# Patient Record
Sex: MALE
[De-identification: names, ages, dates, MRNs, and addresses within clinical notes are randomized per-mention and may not be internally consistent; named-entity substitution may affect disease eponyms.]

---

## 2018-10-03 ENCOUNTER — HOSPITAL ENCOUNTER (INPATIENT)
Dept: HOSPITAL 14 - H.ER | Age: 28
LOS: 6 days | Discharge: HOME | DRG: 430 | End: 2018-10-09
Attending: PSYCHIATRY & NEUROLOGY | Admitting: PSYCHIATRY & NEUROLOGY
Payer: COMMERCIAL

## 2018-10-03 VITALS — BODY MASS INDEX: 34.8 KG/M2

## 2018-10-03 DIAGNOSIS — F90.9: ICD-10-CM

## 2018-10-03 DIAGNOSIS — F33.9: Primary | ICD-10-CM

## 2018-10-03 DIAGNOSIS — Z91.14: ICD-10-CM

## 2018-10-03 DIAGNOSIS — F51.05: ICD-10-CM

## 2018-10-03 DIAGNOSIS — Z23: ICD-10-CM

## 2018-10-03 DIAGNOSIS — F12.10: ICD-10-CM

## 2018-10-03 DIAGNOSIS — F41.0: ICD-10-CM

## 2018-10-03 DIAGNOSIS — K58.9: ICD-10-CM

## 2018-10-03 DIAGNOSIS — F17.200: ICD-10-CM

## 2018-10-03 DIAGNOSIS — F41.1: ICD-10-CM

## 2018-10-04 VITALS — OXYGEN SATURATION: 97 %

## 2018-10-04 LAB
HDLC SERPL-MCNC: 33 MG/DL (ref 30–70)
LDLC SERPL-MCNC: 93 MG/DL (ref 0–129)
T4 SERPL-MCNC: 6.93 UG/DL (ref 5.5–11)

## 2018-10-04 PROCEDURE — GZHZZZZ GROUP PSYCHOTHERAPY: ICD-10-PCS | Performed by: PSYCHIATRY & NEUROLOGY

## 2018-10-04 PROCEDURE — 3E02340 INTRODUCTION OF INFLUENZA VACCINE INTO MUSCLE, PERCUTANEOUS APPROACH: ICD-10-PCS | Performed by: PSYCHIATRY & NEUROLOGY

## 2018-10-04 PROCEDURE — GZ51ZZZ INDIVIDUAL PSYCHOTHERAPY, BEHAVIORAL: ICD-10-PCS | Performed by: PSYCHIATRY & NEUROLOGY

## 2018-10-04 NOTE — PCM.PSYCH
Initial Psychiatric Evaluation





- Initial Psychiatric Evaluation


Type of Admission: Voluntary


Legal Status: Capacity


Chief Complaint (in patient's own words): 





I am feeling depressed and scared


History of Present Illness and Precipitating Events: 





pt is 28 ys old male with previous psychiatric diagnosis of depression and 

anxiety, last hospitalization at [facility] 8 months ago, non compliant 

with medications or follow up for past two months, pt reported has been feeling 

increasingly depressed due to financial status , living with a friend, reported 

decreased sleep with early insomnia, low energy, low interest and anhedonia, pt 

also has been feeling increasingly anxious with sense of dome , fear and panic 

attacks in the form of apprehension and tense mood, he came to ER seeking help


pt reported using cannabis, denied active suicidal ideation on the unit, denied 

perceptual disturbances  


Current Medications: 





Active Medications











Generic Name Dose Route Start Last Admin





  Trade Name Freq  PRN Reason Stop Dose Admin


 


Acetaminophen  650 mg  10/04/18 00:16  





  Tylenol 325mg Tab  PO   





  Q4 PRN   





  pain level 4-7   





     





     





     


 


Al Hydrox/Mg Hydrox/Simethicone  30 ml  10/04/18 00:16  





  Maalox Plus 30 Ml  PO   





  Q4 PRN   





  Dyspepsia   





     





     





     


 


Diphenhydramine HCl  50 mg  10/04/18 00:16  





  Benadryl  IM   





  Q6 PRN   





  Extrapyramidal S/S Unable PO   





     





     





     


 


Diphenhydramine HCl  50 mg  10/04/18 00:16  





  Benadryl  PO   





  Q6 PRN   





  Extrapyramidal Symptoms   





     





     





     


 


Diphenhydramine HCl  50 mg  10/04/18 00:18  





  Benadryl  PO   





  HS PRN   





  Sleep   





     





     





     


 


Duloxetine HCl  30 mg  10/04/18 09:00  





  Cymbalta  PO   





  DAILY MADY   





     





     





     





     


 


Gabapentin  100 mg  10/04/18 09:00  





  Neurontin  PO   





  TID MADY   





     





     





     





     


 


Haloperidol  5 mg  10/04/18 00:16  





  Haldol  PO   





  Q4 PRN   





  Agitation   





     





     





     


 


Haloperidol Lactate  5 mg  10/04/18 00:16  





  Haldol  IM   





  Q4 PRN   





  Agitation, Unable to Take PO   





     





     





     


 


Hydroxyzine Pamoate  50 mg  10/04/18 08:58  





  Vistaril  PO   





  Q8 PRN   





  Anxiety   





     





     





     


 


Influenza Virus Vaccine  60 mcg  10/04/18 09:00  





  Afluria Quad (Pf) [id number]  IM  10/04/18 09:01  





  .ONCE ONE   





     





     





     





     


 


Lorazepam  1 mg  10/04/18 00:16  





  Ativan  IM   





  Q8H PRN   





  Anxiety/Agitation,Unable PO   





     





     





     


 


Lorazepam  1 mg  10/04/18 00:16  





  Ativan  PO   





  Q8H PRN   





  Anxiety/Agitation   





     





     





     


 


Magnesium Hydroxide  30 ml  10/04/18 00:16  





  Milk Of Magnesia  PO   





  HS PRN   





  Constipation   





     





     





     


 


Trazodone HCl  300 mg  10/04/18 22:00  





  Desyrel  PO   





  HS MADY   





     





     





     





     


 


Trazodone HCl  100 mg  10/04/18 22:00  





  Desyrel  PO   





  HS MADY   





     





     





     





     














Past Psychiatric History





- Past Psychiatric History


Explanation of prior treatment: 





five inpatient hospitalizations since age 21 for depression and anxiety


no hx of previous suicidal attempts


Pertinent Medical Hx (Current Medical&Sleep Prob, Allergies): 





                                    Allergies











Allergy/AdvReac Type Severity Reaction Status Date / Time


 


No Known Allergies Allergy   Verified 04/21/17 13:21








                                        





Mirtazapine [Remeron] 45 mg PO HS PRN #14 tab 04/26/17 


Nicotine 7 mg/24 hr [Nicoderm CQ] 1 patch TD DAILY #14 patch 04/26/17 


Venlafaxine [Effexor XR] 150 mg PO DAILY #14 cer 04/26/17 


buPROPion XL [Wellbutrin XL] 300 mg PO DAILY #14 t24 04/26/17 


risperiDONE [RisperDAL Tab] 0.5 mg PO AMHS #30 tab 04/26/17 











Mental Status Examination





- Personal Presentation


Personal Presentation: Looks stated age


Additional comments: 





unkempt





- Affect


Affect: Constricted, Depressed





- Motor Activity


Motor Activity: Psychomotor Retardation





- Reliability in Providing Information


Reliability in Providing Information: Fair





- Speech


Speech: Relevant





- Mood


Mood: Depressed, Anxious





- Formal Thought Process


Formal Thought Process: Circumstantial





- Obsessions/Compulsions


Obsessions: No


Compulsions: No





- Cognitive Functions


Orientation: Person, Place, Situation


Sensorium: Alert


Judgement: Imparied, as evidence by: Poor judgement





- Risk


Risk: Diminished functioning





- Strength & Assets Inventory


Strength & Assets Inventory: Life experience





- Limitations


Additional comments: 


poor compliance








DSM 5 DX





- DSM 5


DSM 5 Diagnosis: 





major depression recurrent


genralized anxiety disorder 


cannabis abuse


IBS





- Recommended/Plan of Treatment


Treatment Recommendations and Plan of Treatment: 





START CYMBALTA 30MG DAILY, INCREASE GRADUALLY


START NEURONTIN 100MG TID


CBT motivational, group and supportive therapy

## 2018-10-04 NOTE — CP.PCM.CON
History of Present Illness





- History of Present Illness


History of Present Illness: 





27 yo male with history of depression and anxiety admitted to psyche unit 

because of worsening depression.

















Review of Systems





- Review of Systems


All systems: reviewed and no additional remarkable complaints except (aside from

those mentioned above, 12 point system review were negative by me)





Past Patient History





- Infectious Disease


Hx of Infectious Diseases: None





- Tetanus Immunizations


Tetanus Immunization: Unknown





- Past Medical History & Family History


Past Medical History?: No


Past Family History: Reviewed and not pertinent





- Past Social History


Smoking Status: Heavy Smoker > 10 Cigarettes Daily


Chewing Tobacco Use: No


Cigar Use: No


Alcohol: None


Drugs: Cannabis





- CARDIAC


Hx Cardiac Disorders: No





- PULMONARY


Hx Respiratory Disorders: No





- NEUROLOGICAL


Hx Neurological Disorder: No





- HEENT


Hx HEENT Problems: No





- RENAL


Hx Chronic Kidney Disease: No





- ENDOCRINE/METABOLIC


Hx Endocrine Disorders: No





- HEMATOLOGICAL/ONCOLOGICAL


Hx Blood Disorders: No





- INTEGUMENTARY


Hx Dermatological Problems: No





- MUSCULOSKELETAL/RHEUMATOLOGICAL


Hx Musculoskeletal Disorders: No





- GASTROINTESTINAL


Hx Irritable Bowel: Yes





- GENITOURINARY/GYNECOLOGICAL


Hx Genitourinary Disorders: No





- PSYCHIATRIC


Hx Substance Use: Yes





- SURGICAL HISTORY


Hx Surgeries: No





- ANESTHESIA


Hx Anesthesia: No





Meds


Allergies/Adverse Reactions: 


                                    Allergies











Allergy/AdvReac Type Severity Reaction Status Date / Time


 


No Known Allergies Allergy   Verified 04/21/17 13:21














- Medications


Medications: 


                               Current Medications





Acetaminophen (Tylenol 325mg Tab)  650 mg PO Q4 PRN


   PRN Reason: pain level 4-7


Al Hydrox/Mg Hydrox/Simethicone (Maalox Plus 30 Ml)  30 ml PO Q4 PRN


   PRN Reason: Dyspepsia


Diphenhydramine HCl (Benadryl)  50 mg IM Q6 PRN


   PRN Reason: Extrapyramidal S/S Unable PO


Diphenhydramine HCl (Benadryl)  50 mg PO Q6 PRN


   PRN Reason: Extrapyramidal Symptoms


Diphenhydramine HCl (Benadryl)  50 mg PO HS PRN


   PRN Reason: Sleep


Duloxetine HCl (Cymbalta)  30 mg PO DAILY [facility]


   Last Admin: 10/04/18 15:00 Dose:  30 mg


Gabapentin (Neurontin)  100 mg PO TID [facility]


   Last Admin: 10/04/18 17:32 Dose:  100 mg


Haloperidol (Haldol)  5 mg PO Q4 PRN


   PRN Reason: Agitation


Haloperidol Lactate (Haldol)  5 mg IM Q4 PRN


   PRN Reason: Agitation, Unable to Take PO


Hydroxyzine Pamoate (Vistaril)  50 mg PO Q8 PRN


   PRN Reason: Anxiety


   Last Admin: 10/04/18 17:34 Dose:  50 mg


Lorazepam (Ativan)  1 mg IM Q8H PRN


   PRN Reason: Anxiety/Agitation,Unable PO


Magnesium Hydroxide (Milk Of Magnesia)  30 ml PO HS PRN


   PRN Reason: Constipation


Trazodone HCl (Desyrel)  300 mg PO HS MADY











Physical Exam





- Constitutional


Appears: No Acute Distress





- Head Exam


Head Exam: ATRAUMATIC





- Eye Exam


Eye Exam: absent: Scleral icterus





- ENT Exam


ENT Exam: Mucous Membranes Moist





- Neck Exam


Neck exam: Negative for: Meningismus





- Respiratory Exam


Respiratory Exam: absent: Rales, Rhonchi, Wheezes, Respiratory Distress





- Cardiovascular Exam


Cardiovascular Exam: REGULAR RHYTHM, +S1, +S2





- GI/Abdominal Exam


GI & Abdominal Exam: Soft.  absent: Tenderness





- Rectal Exam


Rectal Exam: Deferred





- Extremities Exam


Extremities exam: Negative for: calf tenderness, pedal edema





- Back Exam


Back exam: NORMAL INSPECTION





- Neurological Exam


Neurological exam: Alert, Oriented x3





- Psychiatric Exam


Psychiatric exam: Normal Affect





- Skin


Skin Exam: Dry, Intact





Results





- Vital Signs


Recent Vital Signs: 


                                Last Vital Signs











Temp  97.3 F L  10/04/18 01:00


 


Pulse  77   10/04/18 01:03


 


Resp  18   10/04/18 01:03


 


BP  124/66   10/04/18 01:00


 


Pulse Ox  97   10/04/18 00:08














- Labs


Labs: 


                         Laboratory Results - last 24 hr











  10/04/18 10/04/18 10/04/18





  06:00 06:00 06:00


 


Hemoglobin A1c   5.1 


 


Triglycerides  214 H  


 


Cholesterol  174  


 


LDL Cholesterol Direct  93  


 


HDL Cholesterol  33  


 


Thyroxine (T4)  6.93  


 


TSH 3rd Generation  0.94  


 


RPR    Nonreactive














Assessment & Plan


(1) Depression


Status: Acute   


Comment: psyche is managing

## 2018-10-04 NOTE — PCM.BM
<Kendell Huerta P - Last Filed: 10/04/18 00:31>





Treatment Plan Problems





- Problems identified on initial assessmt


  ** Hopelessness/Helplessness


Date Initiated: 10/04/18


Time Initiated: 00:32


Assessment reference: NA


Status: Active





  ** Medication nonadherence


Date Initiated: 10/04/18


Time Initiated: 00:32


Assessment reference: NA





Treatment assets and liabiliti


Patient Assests: cooperative, ADL independent, physically healthy, negotiates 

basic needs, cognitively intact


Patient Liabilities: substance abuse





- Milieu Protocol


Maintain good personal hygiene: daily Encourage regular showers, daily Remind 

patient to perform daily oral care, daily Assist patient to perform ADL's


Conduct patient checks and document Observation sheet: Q15 minutes


Maintain personal safety: every shift Educate patient to report safety concerns 

to staff, every shift Monitor environment for contraband/sharps


Medication safety: Monitor for expected outcome, potential side effects: every 

shift, Assess barriers to learning: every shift, Assess readiness for medication

education: every shift





<Mario Ace J - Last Filed: 10/05/18 15:02>





Family Contact


Family involvement: Famliy/SO not involved


Family contact: Patient declines to allow family contact at present


Family contact name: Pt denied. 





- Goals for Treatment


Patient goals for treatment: Pt reported he would like his depression lessened 

so he can function and care for himself.





Discharge/Continuing Care





- Education Needs


Education Needs: Patient Medication, Patient Diagnosis/Disease Process, Patient 

Coping Skills, Patient Community resources, Patient Aftercare Safety Plan





- Discharge


Discharge Criteria: Tolerates medication w/o severe side effects, Free of 

Suicidal thoughts, Free of agitation, Normal sleep pattern, Ability to care for 

self, Reduction of target symptoms


Discharge to:: Home





- Treatment Team Participation


Patient/Family/SO Statement: 





10/05/18 15:03


Pt seen in team. Pt reported he is "overwhelmingly" depressed, but denied SI. Pt

denied any other complaints and wants to follow-up with [facility] outpatient 

upon discharge. 


Discussed with Family/SO: No


Was Patient/Family/SO present at Treatment Team Meeting: Yes





<Soha Andrade - Last Filed: 10/08/18 14:42>





- Diagnosis


(1) Depression


Status: Acute   


Interventions: 


psychotherapy, pharmacotherapy


10/08/18 14:42

## 2018-10-05 NOTE — PCM.PYCHPN
Psychiatric Progress Note





- Psychiatric Progress Note


Patient seen today, length of contact: pt evaluated discussed with team chart 

reviewed


Patient Chief Complaint: 





I have overwhelming anxiety and depression


Problems Identified/Issues Discussed: 


pt evaluated with treatment team, dressed in hospital gown, unkempt, psychomotor

retardation noted, continues to report increased anxiety with sense of dome , 

feeling keyed up, reported low motivation, anhedonia , poor energy, increased 

sleep and lack of interest in any activity


discussed with pt increasing dose of cymbalta and neurontin, no reported side 

effects


encouraged pt to attend groups and participate in unit activities


pt denied current active suicidal ideation on the unit, denied perceptual 

disturbances 





Medical Problems: 





five inpatient hospitalizations since age 21 for depression and anxiety


no hx of previous suicidal attempts


DSM 5 Symptoms Update: 





major depression 


generalized anxiety disorder


cannabis use disorder


Medication Change: Yes (increase cymbalta)


Medical Record Reviewed: Yes





Mental Status Examination





- Cognitive Function


Orientation: Person, Place, Situation


Attention: WNL


Concentration: WNL


Association: WNL


Fund of Knowledge: Poor


Decription of patient's judgement and insights: 





partial insight , poor judgment 





- Mood


Mood: Depressed, Anxious





- Affect


Affect: Constricted, Depressed





- Speech


Speech: Soft





- Formal Thought Process


Formal Thought Process: Circumstantial


Psychotic Thoughts and Behaviors: 





pt denied, non elicited 





- Suicidal Ideation


Suicidal Ideation: No





- Homicidal Ideation


Homicidal Ideation: No





Goal/Treatment Plan





- Goal/Treatment Plan


Need for Continued Stay: Severe depression anxiety, Discharge may exacerbated 

symptoms, Failed transitioning


Progress Toward Problem(s) and Goals/Treatment Plan: 





increase cymbalta, 40mg daily


increase neurontin 200mg tid


CBT motivational, group and supportive therapy


 to arrange for follow up plan








Estimated Date of D/C: 10/09/18

## 2018-10-06 NOTE — PCM.PYCHPN
Psychiatric Progress Note





- Psychiatric Progress Note


Patient seen today, length of contact: pt evaluated discussed with team chart 

reviewed


Patient Chief Complaint: 





alteration in mood


Problems Identified/Issues Discussed: 





alteration in mood 


alteration in cognition 


Medical Problems: 





per chart


Diagnostic Results: 





per psychiatry


per medicine


per nursing


per social work


per recreational therapy


DSM 5 Symptoms Update: 





anxiety insomnia


Medication Change: No


Medical Record Reviewed: Yes


Consults ordered or reviewed: 





pt seen by hospitalist





Mental Status Examination





- Cognitive Function


Orientation: Person, Place, Situation


Attention: WNL


Concentration: WNL


Association: WNL


Fund of Knowledge: Poor


Decription of patient's judgement and insights: 





impaired





- Mood


Mood: Depressed, Anxious





- Affect


Affect: Constricted, Depressed





- Speech


Speech: Soft





- Formal Thought Process


Formal Thought Process: Circumstantial





- Suicidal Ideation


Suicidal Ideation: No





- Homicidal Ideation


Homicidal Ideation: No





Goal/Treatment Plan





- Goal/Treatment Plan


Need for Continued Stay: Severe depression anxiety, Discharge may exacerbated 

symptoms, Failed transitioning


Progress Toward Problem(s) and Goals/Treatment Plan: 





inpt milieu


adjust meds per status


assess for s/s of serotonin syndrome vital signs per clinical status and per 

protocol 


discharge planning in progress





Estimated Date of D/C: 10/09/18





- Smoking Cessation


Smoking Cessation Initiated: No


Reason for not providing: pt defers

## 2018-10-07 NOTE — PCM.PYCHPN
Psychiatric Progress Note





- Psychiatric Progress Note


Patient seen today, length of contact: pt evaluated discussed with team chart 

reviewed


Patient Chief Complaint: 





reports feeling anxious at times reports that in past has taken clonazepam, has 

been off of it 6 months has reported prn at home requests possibility of having 

here prn at [facility] as becomes nervous at night-staff report pt has slept with 

trazodone last night denies side effects medications. review possible use of 

buspar pt verbally agreeable  


Problems Identified/Issues Discussed: 





alteration in mood 


alteration in cognition 


Medical Problems: 





per chart


Diagnostic Results: 





per psychiatry


per medicine


per nursing


per social work


per recreational therapy


DSM 5 Symptoms Update: 





improving mood less anxious and depressed review can follow up opd related to 

reported hx of adhd-review general terms strattera but review with pt current on

cymbalta. staff reports pt was adherent. seen about unit. 


Medication Change: No


Medical Record Reviewed: Yes


Consults ordered or reviewed: 





seen by hospitalist





Mental Status Examination





- Cognitive Function


Orientation: Person, Place, Situation


Attention: WNL


Concentration: WNL


Association: WNL


Fund of Knowledge: Poor


Decription of patient's judgement and insights: 





impaired





- Mood


Mood: Depressed, Anxious





- Affect


Affect: Constricted, Depressed





- Speech


Speech: Soft





- Formal Thought Process


Formal Thought Process: Circumstantial





- Suicidal Ideation


Suicidal Ideation: No





- Homicidal Ideation


Homicidal Ideation: No





Goal/Treatment Plan





- Goal/Treatment Plan


Need for Continued Stay: Severe depression anxiety, Discharge may exacerbated 

symptoms, Failed transitioning


Progress Toward Problem(s) and Goals/Treatment Plan: 





inpt milieu


adjust meds per status


assess for s/s of serotonin syndrome vital signs per clinical status and per 

protocol 


review with pt buspar-review pros cons-review can have first dose tonight 


discharge planning in progress





Estimated Date of D/C: 10/09/18





- Smoking Cessation


Smoking Cessation Initiated: Yes

## 2018-10-08 NOTE — PCM.PYCHPN
Psychiatric Progress Note





- Psychiatric Progress Note


Patient seen today, length of contact: pt evaluated discussed with team chart 

reviewed


Patient Chief Complaint: 





I am less anxious


Problems Identified/Issues Discussed: 


pt evaluated reported feeling less anxious with starting buspar, reported mood 

less depressed, no reported side effects of medications 


encouraged pt to attend groups, pt denied any current suicidal or homicidal 

ideation, . denied perceptual disturbances


Medical Problems: 





five inpatient hospitalizations since age 21 for depression and anxiety


no hx of previous suicidal attempts


DSM 5 Symptoms Update: 





major depression


generalized anxiety


cannabis use disorder


Medication Change: No


Medical Record Reviewed: Yes





Mental Status Examination





- Cognitive Function


Orientation: Person, Place, Situation


Memory: Intact


Attention: WNL


Concentration: WNL


Association: WNL


Fund of Knowledge: Poor


Decription of patient's judgement and insights: 





partial insight , poor judgment 





- Mood


Mood: Anxious





- Affect


Affect: Constricted, Depressed





- Speech


Speech: Appropriate





- Formal Thought Process


Formal Thought Process: Circumstantial


Psychotic Thoughts and Behaviors: 





pt denied perceptual disturbances





- Suicidal Ideation


Suicidal Ideation: No





- Homicidal Ideation


Homicidal Ideation: No





Goal/Treatment Plan





- Goal/Treatment Plan


Need for Continued Stay: Severe depression anxiety, Discharge may exacerbated 

symptoms, Failed transitioning


Progress Toward Problem(s) and Goals/Treatment Plan: 





continue  cymbalta, 40mg daily


 neurontin 200mg tid


buspar 5mg bid 


CBT motivational, group and supportive therapy


 to arrange for follow up plan








Estimated Date of D/C: 10/09/18

## 2018-10-09 VITALS
SYSTOLIC BLOOD PRESSURE: 129 MMHG | DIASTOLIC BLOOD PRESSURE: 71 MMHG | TEMPERATURE: 97.6 F | HEART RATE: 68 BPM | RESPIRATION RATE: 18 BRPM

## 2018-10-09 NOTE — PCM.PYCHDC
Mental Status Examination





- Mental Status Examination


Orientation: Person, Place, Situation


Memory: Intact


Mood: Neutral


Affect: Broad


Speech: Appropriate


Attention: WNL


Concentration: WNL


Association: WNL


Fund of Knowledge: WNL


Formal Thought Process: No Impairment


Description of patient's judgement and insight: 





partial insight , poor judgment 


Psychotic Thoughts and Behaviors: 





pt denied perceptual disturbances


Suicidal Ideation: No


Current Homicidal Ideation?: No





Discharge Summary





- Discharge Note


Reason for Hospitalization: 





pt is 28 ys old male with previous psychiatric diagnosis of depression and 

anxiety, last hospitalization at [facility] 8 months ago, non compliant 

with medications or follow up for past two months, pt reported has been feeling 

increasingly depressed due to financial status , living with a friend, reported 

decreased sleep with early insomnia, low energy, low interest and anhedonia, pt 

also has been feeling increasingly anxious with sense of dome , fear and panic 

attacks in the form of apprehension and tense mood, he came to ER seeking help


pt reported using cannabis, denied active suicidal ideation on the unit, denied 

perceptual disturbances  





Consultations:: List each consultation separately and include:  1. Reason for 

request.  2. Findings.  3. Follow-up


Summary of Hospital Course include:: 1. Description of specific treatment plan 

utilized for patients during their course of treatmen.  2. Summarize the time-

course for resolution of acute symptoms and/or regressed behaviors.  3. Describe

issues identified and worked on during hospitalization.  4. Describe medication 

utilized.  5. Describe medical problems identified and treated.  6. Reassessment

of suicide risk


Summary of Hospital Course: 





pt on admission was started on cymbalta, 20mg it was gradually increased to 60mg

for depression anxiety and IBS


pt was also placed on neurontin 200mg tid for anxiety


CBT group and supportive therapy provided


motivational therapy provided in refernce to cannabis use


pt denied side effects of medications, on discharge mental status was stable , 

pt denied suicidal or homicidal ideation, denied perceptual disturbances 





- Diagnosis


(1) Depression


Current Visit: No   Status: Acute   





- Final Diagnosis (DSM 5)


Condition upon Discharge: STABLE


DSM 5: 





generalized anxiety disorder


major depression recurrent 


cannabis use disorder





Disposition: HOME/ ROUTINE


Prescriptions/Medication Reconciliation: 


busPIRone [Buspar] 5 mg PO BID 30 Days #60 tab


DULoxetine [Cymbalta] 60 mg PO DAILY 30 Days #30 ecc


Gabapentin [Neurontin] 200 mg PO TID 30 Days #90 cap


Nicotine 7 mg/24 hr [Nicoderm CQ] 1 patch TD DAILY #14 patch


traZODone [Desyrel] 300 mg PO HS 30 Days #90 tab





- Antipsychotic Medications


Pt discharged on 2 or more routine antipsychotic medications: No